# Patient Record
Sex: FEMALE | Race: WHITE | NOT HISPANIC OR LATINO | URBAN - METROPOLITAN AREA
[De-identification: names, ages, dates, MRNs, and addresses within clinical notes are randomized per-mention and may not be internally consistent; named-entity substitution may affect disease eponyms.]

---

## 2017-06-26 ENCOUNTER — HOSPITAL ENCOUNTER (INPATIENT)
Facility: HOSPITAL | Age: 47
LOS: 2 days | Discharge: HOME | DRG: 392 | End: 2017-06-29
Attending: EMERGENCY MEDICINE | Admitting: SURGERY
Payer: COMMERCIAL

## 2017-06-26 DIAGNOSIS — K57.20 DIVERTICULITIS OF LARGE INTESTINE WITH PERFORATION WITHOUT BLEEDING: ICD-10-CM

## 2017-06-26 DIAGNOSIS — K57.80 DIVERTICULITIS OF INTESTINE WITH PERFORATION WITHOUT BLEEDING, UNSPECIFIED PART OF INTESTINAL TRACT: Primary | ICD-10-CM

## 2017-06-26 LAB
ALBUMIN SERPL BCP-MCNC: 3.6 G/DL
ALP SERPL-CCNC: 87 U/L
ALT SERPL W/O P-5'-P-CCNC: 17 U/L
ANION GAP SERPL CALC-SCNC: 12 MMOL/L
AST SERPL-CCNC: 16 U/L
B-HCG UR QL: NEGATIVE
BASOPHILS # BLD AUTO: 0.06 K/UL
BASOPHILS NFR BLD: 0.5 %
BILIRUB SERPL-MCNC: 0.7 MG/DL
BILIRUB UR QL STRIP: NEGATIVE
BUN SERPL-MCNC: 13 MG/DL
CALCIUM SERPL-MCNC: 9.8 MG/DL
CHLORIDE SERPL-SCNC: 102 MMOL/L
CLARITY UR: CLEAR
CO2 SERPL-SCNC: 23 MMOL/L
COLOR UR: ABNORMAL
CREAT SERPL-MCNC: 0.8 MG/DL
CTP QC/QA: YES
DIFFERENTIAL METHOD: ABNORMAL
EOSINOPHIL # BLD AUTO: 0.3 K/UL
EOSINOPHIL NFR BLD: 2 %
ERYTHROCYTE [DISTWIDTH] IN BLOOD BY AUTOMATED COUNT: 13.1 %
EST. GFR  (AFRICAN AMERICAN): >60 ML/MIN/1.73 M^2
EST. GFR  (NON AFRICAN AMERICAN): >60 ML/MIN/1.73 M^2
GLUCOSE SERPL-MCNC: 110 MG/DL
GLUCOSE UR QL STRIP: NEGATIVE
HCT VFR BLD AUTO: 39.5 %
HGB BLD-MCNC: 13.5 G/DL
HGB UR QL STRIP: NEGATIVE
KETONES UR QL STRIP: NEGATIVE
LEUKOCYTE ESTERASE UR QL STRIP: NEGATIVE
LYMPHOCYTES # BLD AUTO: 2.1 K/UL
LYMPHOCYTES NFR BLD: 16.4 %
MCH RBC QN AUTO: 26.9 PG
MCHC RBC AUTO-ENTMCNC: 34.2 %
MCV RBC AUTO: 79 FL
MONOCYTES # BLD AUTO: 1 K/UL
MONOCYTES NFR BLD: 7.7 %
NEUTROPHILS # BLD AUTO: 9.3 K/UL
NEUTROPHILS NFR BLD: 73.2 %
NITRITE UR QL STRIP: NEGATIVE
PH UR STRIP: 6 [PH] (ref 5–8)
PLATELET # BLD AUTO: 361 K/UL
PMV BLD AUTO: 9.5 FL
POTASSIUM SERPL-SCNC: 3.7 MMOL/L
PROT SERPL-MCNC: 8.2 G/DL
PROT UR QL STRIP: NEGATIVE
RBC # BLD AUTO: 5.01 M/UL
SODIUM SERPL-SCNC: 137 MMOL/L
SP GR UR STRIP: 1.02 (ref 1–1.03)
URN SPEC COLLECT METH UR: ABNORMAL
UROBILINOGEN UR STRIP-ACNC: NEGATIVE EU/DL
WBC # BLD AUTO: 12.65 K/UL

## 2017-06-26 PROCEDURE — 81025 URINE PREGNANCY TEST: CPT | Performed by: PHYSICIAN ASSISTANT

## 2017-06-26 PROCEDURE — 80053 COMPREHEN METABOLIC PANEL: CPT

## 2017-06-26 PROCEDURE — 63600175 PHARM REV CODE 636 W HCPCS: Performed by: PHYSICIAN ASSISTANT

## 2017-06-26 PROCEDURE — 96365 THER/PROPH/DIAG IV INF INIT: CPT

## 2017-06-26 PROCEDURE — 25500020 PHARM REV CODE 255: Performed by: EMERGENCY MEDICINE

## 2017-06-26 PROCEDURE — 96376 TX/PRO/DX INJ SAME DRUG ADON: CPT

## 2017-06-26 PROCEDURE — G0378 HOSPITAL OBSERVATION PER HR: HCPCS

## 2017-06-26 PROCEDURE — 25000003 PHARM REV CODE 250: Performed by: PHYSICIAN ASSISTANT

## 2017-06-26 PROCEDURE — 96361 HYDRATE IV INFUSION ADD-ON: CPT

## 2017-06-26 PROCEDURE — 96375 TX/PRO/DX INJ NEW DRUG ADDON: CPT

## 2017-06-26 PROCEDURE — 81003 URINALYSIS AUTO W/O SCOPE: CPT

## 2017-06-26 PROCEDURE — 85025 COMPLETE CBC W/AUTO DIFF WBC: CPT

## 2017-06-26 PROCEDURE — 99285 EMERGENCY DEPT VISIT HI MDM: CPT

## 2017-06-26 RX ORDER — DEXTROSE MONOHYDRATE AND SODIUM CHLORIDE 5; .9 G/100ML; G/100ML
INJECTION, SOLUTION INTRAVENOUS CONTINUOUS
Status: DISCONTINUED | OUTPATIENT
Start: 2017-06-26 | End: 2017-06-29

## 2017-06-26 RX ORDER — SODIUM CHLORIDE 9 MG/ML
1000 INJECTION, SOLUTION INTRAVENOUS
Status: COMPLETED | OUTPATIENT
Start: 2017-06-26 | End: 2017-06-26

## 2017-06-26 RX ORDER — LISINOPRIL AND HYDROCHLOROTHIAZIDE 10; 12.5 MG/1; MG/1
1 TABLET ORAL DAILY
COMMUNITY

## 2017-06-26 RX ORDER — MORPHINE SULFATE 2 MG/ML
4 INJECTION, SOLUTION INTRAMUSCULAR; INTRAVENOUS
Status: COMPLETED | OUTPATIENT
Start: 2017-06-26 | End: 2017-06-26

## 2017-06-26 RX ORDER — ONDANSETRON 2 MG/ML
4 INJECTION INTRAMUSCULAR; INTRAVENOUS
Status: COMPLETED | OUTPATIENT
Start: 2017-06-26 | End: 2017-06-26

## 2017-06-26 RX ORDER — MORPHINE SULFATE 2 MG/ML
2 INJECTION, SOLUTION INTRAMUSCULAR; INTRAVENOUS EVERY 4 HOURS PRN
Status: DISCONTINUED | OUTPATIENT
Start: 2017-06-26 | End: 2017-06-28

## 2017-06-26 RX ORDER — DICYCLOMINE HYDROCHLORIDE 10 MG/1
20 CAPSULE ORAL
Status: COMPLETED | OUTPATIENT
Start: 2017-06-26 | End: 2017-06-26

## 2017-06-26 RX ORDER — ONDANSETRON 2 MG/ML
4 INJECTION INTRAMUSCULAR; INTRAVENOUS EVERY 12 HOURS PRN
Status: DISCONTINUED | OUTPATIENT
Start: 2017-06-26 | End: 2017-06-29 | Stop reason: HOSPADM

## 2017-06-26 RX ADMIN — SODIUM CHLORIDE 1000 ML: 0.9 INJECTION, SOLUTION INTRAVENOUS at 03:06

## 2017-06-26 RX ADMIN — DICYCLOMINE HYDROCHLORIDE 20 MG: 10 CAPSULE ORAL at 03:06

## 2017-06-26 RX ADMIN — SODIUM CHLORIDE 1000 ML: 0.9 INJECTION, SOLUTION INTRAVENOUS at 06:06

## 2017-06-26 RX ADMIN — MORPHINE SULFATE 4 MG: 2 INJECTION, SOLUTION INTRAMUSCULAR; INTRAVENOUS at 04:06

## 2017-06-26 RX ADMIN — MORPHINE SULFATE 4 MG: 2 INJECTION, SOLUTION INTRAMUSCULAR; INTRAVENOUS at 06:06

## 2017-06-26 RX ADMIN — DEXTROSE AND SODIUM CHLORIDE: 5; .9 INJECTION, SOLUTION INTRAVENOUS at 10:06

## 2017-06-26 RX ADMIN — IOHEXOL 75 ML: 350 INJECTION, SOLUTION INTRAVENOUS at 04:06

## 2017-06-26 RX ADMIN — PIPERACILLIN AND TAZOBACTAM 4.5 G: 4; .5 INJECTION, POWDER, LYOPHILIZED, FOR SOLUTION INTRAVENOUS; PARENTERAL at 06:06

## 2017-06-26 RX ADMIN — ONDANSETRON 4 MG: 2 INJECTION INTRAMUSCULAR; INTRAVENOUS at 03:06

## 2017-06-26 RX ADMIN — MORPHINE SULFATE 2 MG: 2 INJECTION, SOLUTION INTRAMUSCULAR; INTRAVENOUS at 10:06

## 2017-06-26 NOTE — LETTER
June 29, 2017    Irais Carr  [address] [place] [address]                [address]  [place] [address]  Phone: [phone]  Fax: [phone] To whom it may concern,    Irais Carr may return to work on 7/12/2017 without restrictions. She has been receiving medical care from 6/26/17 until 6/29/17 and will require this extra time off work prior to returning in order to preserve her health.      Sincerely,          Henri Meléndez MD

## 2017-06-26 NOTE — ED PROVIDER NOTES
Encounter Date: 2017       History     Chief Complaint   Patient presents with    Abdominal Pain     pt c/o abd pain with diarrhea that began on Friday.  Pt states that LLQ pain has worsened.      Irais Carr, a 46 y.o. female that presents to the ED for abdominal pain that is worse on the left side of her abdomen with associated episodes of nausea and diarrhea that started on Friday.  She states that she's had 3 episodes of diarrhea this morning.  She states she has a history of episodes of intense abdominal cramping with diarrhea every couple of months for the last 2 or 3 years.  She usually just self treats and that her symptoms resolved within a day or two, however her symptoms have not improved with this episode.  She is ever seen a primary care doctor or a GI specialist for this issue.  She describes the pain to her abdomen is cramping and spasm-like in nature, worse with certain movement and touch and only slightly improved with rest.  She now denies any blood in her stool or vomiting.  Treatments tried include a bland diet with no improvement of symptoms.      The history is provided by the patient.     Review of patient's allergies indicates:  No Known Allergies  Past Medical History:   Diagnosis Date    Hypertension      Past Surgical History:   Procedure Laterality Date    arm surgery       SECTION       History reviewed. No pertinent family history.  Social History   Substance Use Topics    Smoking status: Former Smoker    Smokeless tobacco: Never Used    Alcohol use No     Review of Systems   Constitutional: Negative for fever.   HENT: Negative.    Eyes: Negative for redness.   Respiratory: Negative for shortness of breath.    Cardiovascular: Negative for chest pain.   Gastrointestinal: Positive for abdominal pain, diarrhea and nausea. Negative for constipation and vomiting.   Endocrine: Negative for polydipsia and polyphagia.   Genitourinary: Negative for dysuria and frequency.    Musculoskeletal: Negative for myalgias.   Skin: Negative for color change and rash.   Allergic/Immunologic: Negative for immunocompromised state.   Neurological: Negative for dizziness, weakness and headaches.   Hematological: Does not bruise/bleed easily.   Psychiatric/Behavioral: Negative for agitation and confusion.   All other systems reviewed and are negative.      Physical Exam     Initial Vitals [06/26/17 1512]   BP Pulse Resp Temp SpO2   (!) 155/87 98 16 98.4 °F (36.9 °C) 100 %      MAP       109.67         Physical Exam    Nursing note and vitals reviewed.  Constitutional: She appears well-developed and well-nourished. No distress.   HENT:   Head: Normocephalic and atraumatic.   Right Ear: External ear normal.   Left Ear: External ear normal.   Nose: Nose normal.   Mouth/Throat: Oropharynx is clear and moist. Mucous membranes are dry.   Eyes: Conjunctivae and EOM are normal.   Neck: Normal range of motion. No tracheal deviation present.   Cardiovascular: Normal rate and regular rhythm.   Pulmonary/Chest: Breath sounds normal. No respiratory distress. She has no wheezes. She has no rhonchi. She has no rales.   Abdominal: Soft. Normal appearance and bowel sounds are normal. She exhibits no distension. There is tenderness in the suprapubic area and left lower quadrant. There is no rigidity, no rebound, no guarding, no CVA tenderness, no tenderness at McBurney's point and negative Milian's sign.   Musculoskeletal: Normal range of motion. She exhibits no tenderness.   Neurological: She is alert and oriented to person, place, and time. She has normal strength.   Skin: Skin is warm and dry. No rash noted. No erythema.   Psychiatric: She has a normal mood and affect. Thought content normal.         ED Course   Procedures  Labs Reviewed   CBC W/ AUTO DIFFERENTIAL - Abnormal; Notable for the following:        Result Value    MCV 79 (*)     MCH 26.9 (*)     Platelets 361 (*)     Gran # 9.3 (*)     Gran% 73.2 (*)      Lymph% 16.4 (*)     All other components within normal limits   URINALYSIS - Abnormal; Notable for the following:     Color, UA Brown (*)     All other components within normal limits   COMPREHENSIVE METABOLIC PANEL   POCT URINE PREGNANCY        Imaging Results          CT Abdomen Pelvis With Contrast (Final result)     Abnormal  Result time 06/26/17 17:59:32    Final result by Wilber Joyner MD (06/26/17 17:59:32)                 Impression:       1.  Diverticulosis coli with inflammatory changes surrounding the sigmoid mesocolon, consistent with acute sigmoid diverticulitis.  Punctate focus of air medial to the regions of inflammation in the sigmoid mesocolon.  This may represent a small area of microperforation.  Clinical correlation and close follow up is suggested. Endoscopic Correlation is suggested after the patient's acute symptoms resolve.     2.  Intrauterine device in place located in the level of the lower uterine segment/cervix.  Suggest clinical correlation and followup with OB/GYN.    3.  Additional findings as above.    Report has been flagged in the EPIC medical record system.        Electronically signed by: WILBER JOYNER MD  Date:     06/26/17  Time:    17:59              Narrative:    Exam: [id number]  06/26/17  16:45:27 [id number] ([facility]) : CT ABDOMEN PELVIS WITH CONTRAST    Technique:    Axial CT Scan of the abdomen and pelvis was performed from the lung base to the public symphysis after the intravenous administration of  75 cc of Omni 350. Coronal and Sagittal reformats were obtained. Delayed images were also obtained    Comparison:     None     Findings:      The lung bases are within normal limits.  The heart is normal in appearance.  The vessels are unremarkable.  There is no evidence of lymphadenopathy in the abdomen or pelvis.    The esophagus, stomach, and duodenum are within normal limits.  The small bowel loops are unremarkable.  The appendix is within normal limits.  There is diverticulosis  coli.  There are inflammatory changes surrounding the sigmoid mesocolon with associated pericolonic stranding.  There is tiny focus of air along the medial aspect of the regions of inflammation.  No drainable collection is identified.    The liver, gallbladder, and biliary system are within normal limits.  The spleen, pancreas, and adrenal glands are within normal limits.    The right kidney is unremarkable.  There are subcentimeter low attenuation lesions in the left kidney, these are too small to completely characterize.  The ureters and urinary bladder are within normal limits.  There is an intrauterine device in place.  There is low position of the intrauterine device in the region of the millimeters in segment/cervix.  The adnexal structures are unremarkable.    There is small amount of fluid in the left posterior cul-de-sac.  There is no evidence of pneumatosis.    There are degenerative changes in the osseous structures.  There is no evidence of fracture.  The abdominal wall is within normal limits.                                 Medical Decision Making:   Initial Assessment:   Abdominal pain, diarrhea, nausea  Differential Diagnosis:   Colitis, diverticulitis, enteritis   Clinical Tests:   Lab Tests: Ordered and Reviewed  The following lab test(s) were unremarkable: CBC, CMP and Urinalysis  Radiological Study: Ordered and Reviewed  ED Management:  Impression presents to ED and moderate distress, afebrile, and nontoxic.  She has tenderness to palpation to the left side of abdomen extending to the suprapubic region without any evidence of peritoneal signs.  Zofran and morphine were given with improvement of nausea and pain.  CBC and CMP showed no acute abnormalities.  CT shows evidence of diverticulosis coli with inflammatory changes surrounding the sigmoid mesocolon, consistent with acute sigmoid diverticulitis.  Punctate focus of air medial to the regions of inflammation in the sigmoid mesocolon.  This may  represent a small area of microperforation.   Dr. Murphy with neurosurgery was consult and for possible admission.  The patient has agreed to admit this patient to have specialty, has instructed to make the patient nothing by mouth and see Rajat of IV Zosyn.  Patient stated that she started to have an increase of her pain therefore more morphine was given.  Patient will be admitted to general surgery service for further evaluation.              Attending Attestation:     Physician Attestation Statement for NP/PA:   I discussed this assessment and plan of this patient with the NP/PA, but I did not personally examine the patient. The face to face encounter was performed by the NP/PA.                  ED Course     Clinical Impression:   The encounter diagnosis was Diverticulitis of intestine with perforation without bleeding, unspecified part of intestinal tract.    Disposition:   Disposition: Discharged  Condition: Stable                        Alice Hernandez PA-C  06/26/17 2128       Kristi Gilmore MD  06/26/17 1020

## 2017-06-26 NOTE — H&P
[facility]  History & Physical    SUBJECTIVE:     Chief Complaint/Reason for Admission: Diverticulitis    History of Present Illness:  Patient is a 46 y.o. female presents with history htn who presents with abdominal pain that she states started last Friday. She states the pain is sharp and achy. The pain is constant but fluctuating and at worst a 10 but improved with pain medication given in the Ed. She states she has had episodes like this previously but has never previously sough medical attention. The denies any n/v but does endorse pain when eating as well as rectal pain. She denies any issues with bowel movement.      (Not in a hospital admission)    Review of patient's allergies indicates:  No Known Allergies    Past Medical History:   Diagnosis Date    Hypertension      Past Surgical History:   Procedure Laterality Date    arm surgery       SECTION       History reviewed. No pertinent family history.  Social History   Substance Use Topics    Smoking status: Former Smoker    Smokeless tobacco: Never Used    Alcohol use No        Review of Systems:  Denies headache, fever, chills, cp, sob, n/v, diarrhea, dysuria, and lower ext swelling      OBJECTIVE:     Vital Signs (Most Recent):  Temp: 96.9 °F (36.1 °C) (17)  Pulse: 77 (17)  Resp: 18 (17)  BP: (!) 100/59 (17)  SpO2: 100 % (17)    Physical Exam:  Gen: NAD  Head: NCAT  Heart: RRR, no m/g/r  Lungs: CTAB  Abd: snt nd  Ext: 2 + peripheral pulses    Laboratory:  CBC:   Recent Labs  Lab 17  1545   WBC 12.65   RBC 5.01   HGB 13.5   HCT 39.5   *   MCV 79*   MCH 26.9*   MCHC 34.2     CMP:   Recent Labs  Lab 17  1545      CALCIUM 9.8   ALBUMIN 3.6   PROT 8.2      K 3.7   CO2 23      BUN 13   CREATININE 0.8   ALKPHOS 87   ALT 17   AST 16   BILITOT 0.7       ASSESSMENT/PLAN:     Diverticulitis  - noted on CT of the abdomen  - will admit for  conservative management with IVF, abx, and pain control  - afebrile and WBC at 12.6  - continue to monitor    Henri Meléndez MD  PGY-2 FM  6:37 PM

## 2017-06-27 PROCEDURE — 63600175 PHARM REV CODE 636 W HCPCS: Performed by: INTERNAL MEDICINE

## 2017-06-27 PROCEDURE — 25000003 PHARM REV CODE 250: Performed by: FAMILY MEDICINE

## 2017-06-27 PROCEDURE — 99221 1ST HOSP IP/OBS SF/LOW 40: CPT | Mod: ,,, | Performed by: INTERNAL MEDICINE

## 2017-06-27 PROCEDURE — 25000003 PHARM REV CODE 250: Performed by: PHYSICIAN ASSISTANT

## 2017-06-27 PROCEDURE — 94761 N-INVAS EAR/PLS OXIMETRY MLT: CPT

## 2017-06-27 PROCEDURE — 63600175 PHARM REV CODE 636 W HCPCS: Performed by: PHYSICIAN ASSISTANT

## 2017-06-27 PROCEDURE — 25000003 PHARM REV CODE 250: Performed by: INTERNAL MEDICINE

## 2017-06-27 PROCEDURE — 11000001 HC ACUTE MED/SURG PRIVATE ROOM

## 2017-06-27 PROCEDURE — 25000003 PHARM REV CODE 250: Performed by: STUDENT IN AN ORGANIZED HEALTH CARE EDUCATION/TRAINING PROGRAM

## 2017-06-27 RX ORDER — MAG HYDROX/ALUMINUM HYD/SIMETH 200-200-20
30 SUSPENSION, ORAL (FINAL DOSE FORM) ORAL
Status: DISCONTINUED | OUTPATIENT
Start: 2017-06-27 | End: 2017-06-29 | Stop reason: HOSPADM

## 2017-06-27 RX ORDER — METRONIDAZOLE 500 MG/100ML
500 INJECTION, SOLUTION INTRAVENOUS
Status: DISCONTINUED | OUTPATIENT
Start: 2017-06-27 | End: 2017-06-29

## 2017-06-27 RX ORDER — ACETAMINOPHEN 325 MG/1
650 TABLET ORAL EVERY 6 HOURS PRN
Status: DISCONTINUED | OUTPATIENT
Start: 2017-06-27 | End: 2017-06-29 | Stop reason: HOSPADM

## 2017-06-27 RX ORDER — CIPROFLOXACIN 2 MG/ML
400 INJECTION, SOLUTION INTRAVENOUS
Status: DISCONTINUED | OUTPATIENT
Start: 2017-06-27 | End: 2017-06-29

## 2017-06-27 RX ADMIN — CIPROFLOXACIN 400 MG: 2 INJECTION, SOLUTION INTRAVENOUS at 02:06

## 2017-06-27 RX ADMIN — MORPHINE SULFATE 2 MG: 2 INJECTION, SOLUTION INTRAMUSCULAR; INTRAVENOUS at 05:06

## 2017-06-27 RX ADMIN — ALUMINUM HYDROXIDE, MAGNESIUM HYDROXIDE, AND SIMETHICONE 30 ML: 200; 200; 20 SUSPENSION ORAL at 10:06

## 2017-06-27 RX ADMIN — ALUMINUM HYDROXIDE, MAGNESIUM HYDROXIDE, AND SIMETHICONE 30 ML: 200; 200; 20 SUSPENSION ORAL at 08:06

## 2017-06-27 RX ADMIN — ACETAMINOPHEN 650 MG: 325 TABLET ORAL at 02:06

## 2017-06-27 RX ADMIN — METRONIDAZOLE 500 MG: 500 INJECTION, SOLUTION INTRAVENOUS at 02:06

## 2017-06-27 RX ADMIN — MORPHINE SULFATE 2 MG: 2 INJECTION, SOLUTION INTRAMUSCULAR; INTRAVENOUS at 03:06

## 2017-06-27 RX ADMIN — DEXTROSE AND SODIUM CHLORIDE: 5; .9 INJECTION, SOLUTION INTRAVENOUS at 09:06

## 2017-06-27 RX ADMIN — DEXTROSE AND SODIUM CHLORIDE: 5; .9 INJECTION, SOLUTION INTRAVENOUS at 05:06

## 2017-06-27 RX ADMIN — MORPHINE SULFATE 2 MG: 2 INJECTION, SOLUTION INTRAMUSCULAR; INTRAVENOUS at 07:06

## 2017-06-27 RX ADMIN — ALUMINUM HYDROXIDE, MAGNESIUM HYDROXIDE, AND SIMETHICONE 30 ML: 200; 200; 20 SUSPENSION ORAL at 05:06

## 2017-06-27 RX ADMIN — MORPHINE SULFATE 2 MG: 2 INJECTION, SOLUTION INTRAMUSCULAR; INTRAVENOUS at 09:06

## 2017-06-27 NOTE — SUBJECTIVE & OBJECTIVE
Past Medical History:   Diagnosis Date    Hypertension        Past Surgical History:   Procedure Laterality Date    arm surgery       SECTION         Review of patient's allergies indicates:  No Known Allergies    Family History  Possible family history of colon cancer in a great grandparent    Social History  No tobacco, alcohol or illicit drug use      Review of Systems   Constitutional: Negative for chills, fever and unexpected weight change.   Respiratory: Negative for cough, choking and chest tightness.    Cardiovascular: Negative for chest pain, palpitations and leg swelling.   Gastrointestinal: Positive for abdominal pain. Negative for anal bleeding, blood in stool, constipation and vomiting.   Neurological: Negative for seizures, light-headedness and headaches.   Psychiatric/Behavioral: Negative for confusion, decreased concentration and hallucinations.     Objective:     Vital Signs (Most Recent):  Temp: 98.6 °F (37 °C) (17 08)  Pulse: 78 (17 08)  Resp: 20 (17 08)  BP: (!) 131/57 (17 08)  SpO2: 99 % (17) Vital Signs (24h Range):  Temp:  [96.9 °F (36.1 °C)-98.6 °F (37 °C)] 98.6 °F (37 °C)  Pulse:  [] 78  Resp:  [16-20] 20  SpO2:  [98 %-100 %] 99 %  BP: ()/(57-87) 131/57     Weight: 108.9 kg (240 lb) (17 1512)  Body mass index is 45.35 kg/m².    No intake or output data in the 24 hours ending 17 1122    Lines/Drains/Airways     Peripheral Intravenous Line                 Peripheral IV - Single Lumen 17 1546 Right Forearm less than 1 day                Physical Exam   Constitutional: She is oriented to person, place, and time. She appears well-developed and well-nourished. No distress.   HENT:   Head: Normocephalic and atraumatic.   Eyes: EOM are normal. Pupils are equal, round, and reactive to light. Right eye exhibits no discharge. Left eye exhibits no discharge. No scleral icterus.   Cardiovascular: Normal rate, regular  rhythm, normal heart sounds and intact distal pulses.  Exam reveals no gallop and no friction rub.    No murmur heard.  Pulmonary/Chest: Effort normal and breath sounds normal. No respiratory distress. She has no wheezes. She has no rales.   Abdominal: Soft. Bowel sounds are normal. She exhibits no distension and no mass. There is tenderness. There is no rebound and no guarding.   Musculoskeletal: Normal range of motion.   Neurological: She is alert and oriented to person, place, and time. No cranial nerve deficit. Coordination normal.   Skin: Skin is warm and dry. She is not diaphoretic.   Psychiatric: Her behavior is normal. Judgment and thought content normal.       Significant Labs:  Reviewed    Significant Imaging:  Reviewed

## 2017-06-27 NOTE — HPI
CC Abdominal pain    HPI The patient is a 47 yo female who presented with acute onset, generalized, cramping lower abdominal pain with several episodes of diarrhea without overt GI bleeding and denies current melena, hematochezia, nausea and vomiting.  She reports previous she has experienced episodes of rectal bleeding but none recently or currently.  She has never had an EGD or colonoscopy.  Possible family history of colon cancer in a great grandparent but the patient is uncertain of the details.    Records reviewed.

## 2017-06-27 NOTE — PROGRESS NOTES
Progress Note    Admit Date: 6/26/2017   LOS: 0 days     SUBJECTIVE:     Pt endorses dark blood per rectum when making BM, otherwise states pain improved with medication    Scheduled Meds:   aluminum-magnesium hydroxide-simethicone  30 mL Oral QID (AC & HS)     Continuous Infusions:   dextrose 5 % and 0.9 % NaCl 125 mL/hr at 06/27/17 0544     PRN Meds:morphine, ondansetron    Review of patient's allergies indicates:  No Known Allergies    Review of Systems  Denies headache, fever, chills, cp, sob, n/v, diarrhea, dysuria, and lower ext swelling      OBJECTIVE:     Vital Signs (Most Recent)  Temp: 98.6 °F (37 °C) (06/27/17 0800)  Pulse: 78 (06/27/17 0800)  Resp: 20 (06/27/17 0800)  BP: (!) 131/57 (06/27/17 0800)  SpO2: 98 % (06/27/17 0454)    Vital Signs Range (Last 24H):  Temp:  [96.9 °F (36.1 °C)-98.6 °F (37 °C)]   Pulse:  []   Resp:  [16-20]   BP: ()/(57-87)   SpO2:  [98 %-100 %]     I & O (Last 24H):No intake or output data in the 24 hours ending 06/27/17 1004  Physical Exam:  Gen: NAD  Head: NCAT  Heart: RRR, no m/g/r  Lungs: CTAB  Abd: snt nd  Ext: 2 + peripheral pulses      Laboratory:  CBC:   Recent Labs  Lab 06/26/17  1545   WBC 12.65   RBC 5.01   HGB 13.5   HCT 39.5   *   MCV 79*   MCH 26.9*   MCHC 34.2     CMP:   Recent Labs  Lab 06/26/17  1545      CALCIUM 9.8   ALBUMIN 3.6   PROT 8.2      K 3.7   CO2 23      BUN 13   CREATININE 0.8   ALKPHOS 87   ALT 17   AST 16   BILITOT 0.7           ASSESSMENT/PLAN:     Diverticulitis  - pt with abd and evidence diverticulitis on CT  - the patient states she gets abdominal pain monthly but has never sought medical attention  - will consult GI due suspicion for recurrent episodes diverticulitis as well as melena  - continue IV abx for 24 hours, conservative management at the moment unless clinical picture changes    Henri Meléndez MD  PGY-2   10:07 AM

## 2017-06-27 NOTE — PLAN OF CARE
Progress notes reviewed:  Diverticulitis:  - noted on CT of the abdomen  - will admit for conservative management with IVF, abx, and pain control  - afebrile and WBC at 12.6  - continue to monitor    Pt does not have PCP; TN to assist with establishing PCP.  Pt voices no other discharge needs.     06/27/17 0929   Discharge Assessment   Assessment Type Discharge Planning Assessment   Confirmed/corrected address and phone number on facesheet? Yes   Assessment information obtained from? Patient   Expected Length of Stay (days) 1   Communicated expected length of stay with patient/caregiver yes   Prior to hospitilization cognitive status: Alert/Oriented   Prior to hospitalization functional status: Independent   Current cognitive status: Alert/Oriented   Current Functional Status: Independent   Arrived From home or self-care   Lives With significant other   Able to Return to Prior Arrangements yes   Is patient able to care for self after discharge? Yes   How many people do you have in your home that can help with your care after discharge? 1   Who are your caregiver(s) and their phone number(s)? Grisel Crane (S.O.) [phone]   Patient's perception of discharge disposition home or selfcare   Readmission Within The Last 30 Days no previous admission in last 30 days   Patient currently being followed by outpatient case management? No   Patient currently receives home health services? No   Does the patient currently use HME? No   Patient currently receives private duty nursing? No   Patient currently receives any other outside agency services? No   Equipment Currently Used at Home none   Do you have any problems affording any of your prescribed medications? No   Is the patient taking medications as prescribed? yes   Do you have any financial concerns preventing you from receiving the healthcare you need? No   Does the patient have transportation to healthcare appointments? Yes   Transportation Available car;family or  friend will provide   On Dialysis? No   Does the patient receive services at the Coumadin Clinic? No   Are there any open cases? No   Discharge Plan A Home   Discharge Plan B Home with family   Patient/Family In Agreement With Plan yes

## 2017-06-27 NOTE — CONSULTS
[facility]  Gastroenterology  Consult Note    Patient Name: Irais Carr  MRN: [id number]  Admission Date: 2017  Hospital Length of Stay: 0 days  Code Status: Full Code   Attending Provider: VERONIKA Hodges MD   Consulting Provider: Cordelia Quiroga MD  Primary Care Physician: Primary Doctor No  Principal Problem:<principal problem not specified>    Inpatient consult to [facility]  Consult performed by: CORDELIA QUIROGA  Consult ordered by: GIOVANNI TERRY  Reason for consult: Diverticulitis  Assessment/Recommendations: See Plan        Subjective:     HPI:  CC Abdominal pain    HPI The patient is a 45 yo female who presented with acute onset, generalized, cramping lower abdominal pain with several episodes of diarrhea without overt GI bleeding and denies current melena, hematochezia, nausea and vomiting.  She reports previous she has experienced episodes of rectal bleeding but none recently or currently.  She has never had an EGD or colonoscopy.  Possible family history of colon cancer in a great grandparent but the patient is uncertain of the details.    Records reviewed.    Past Medical History:   Diagnosis Date    Hypertension        Past Surgical History:   Procedure Laterality Date    arm surgery       SECTION         Review of patient's allergies indicates:  No Known Allergies    Family History  Possible family history of colon cancer in a great grandparent    Social History  No tobacco, alcohol or illicit drug use      Review of Systems   Constitutional: Negative for chills, fever and unexpected weight change.   Respiratory: Negative for cough, choking and chest tightness.    Cardiovascular: Negative for chest pain, palpitations and leg swelling.   Gastrointestinal: Positive for abdominal pain. Negative for anal bleeding, blood in stool, constipation and vomiting.   Neurological: Negative for seizures, light-headedness and headaches.    Psychiatric/Behavioral: Negative for confusion, decreased concentration and hallucinations.     Objective:     Vital Signs (Most Recent):  Temp: 98.6 °F (37 °C) (06/27/17 0800)  Pulse: 78 (06/27/17 0800)  Resp: 20 (06/27/17 0800)  BP: (!) 131/57 (06/27/17 0800)  SpO2: 99 % (06/27/17 0800) Vital Signs (24h Range):  Temp:  [96.9 °F (36.1 °C)-98.6 °F (37 °C)] 98.6 °F (37 °C)  Pulse:  [] 78  Resp:  [16-20] 20  SpO2:  [98 %-100 %] 99 %  BP: ()/(57-87) 131/57     Weight: 108.9 kg (240 lb) (06/26/17 1512)  Body mass index is 45.35 kg/m².    No intake or output data in the 24 hours ending 06/27/17 1122    Lines/Drains/Airways     Peripheral Intravenous Line                 Peripheral IV - Single Lumen 06/26/17 1546 Right Forearm less than 1 day                Physical Exam   Constitutional: She is oriented to person, place, and time. She appears well-developed and well-nourished. No distress.   HENT:   Head: Normocephalic and atraumatic.   Eyes: EOM are normal. Pupils are equal, round, and reactive to light. Right eye exhibits no discharge. Left eye exhibits no discharge. No scleral icterus.   Cardiovascular: Normal rate, regular rhythm, normal heart sounds and intact distal pulses.  Exam reveals no gallop and no friction rub.    No murmur heard.  Pulmonary/Chest: Effort normal and breath sounds normal. No respiratory distress. She has no wheezes. She has no rales.   Abdominal: Soft. Bowel sounds are normal. She exhibits no distension and no mass. There is tenderness. There is no rebound and no guarding.   Musculoskeletal: Normal range of motion.   Neurological: She is alert and oriented to person, place, and time. No cranial nerve deficit. Coordination normal.   Skin: Skin is warm and dry. She is not diaphoretic.   Psychiatric: Her behavior is normal. Judgment and thought content normal.       Significant Labs:  Reviewed    Significant Imaging:  Reviewed    Assessment/Plan:     Diverticulitis of intestine  with perforation without bleeding    -Diverticulitis with possible small microperforation-currently the patient is afebrile, without leukocytosis and vitals or stable  -No diarrhea or GI bleeding currently.  -Continue cipro and flagyl for 14 days total to treat for diverticulitis  -Continue supportive care  -Advance diet as tolerated per surgery primary team  -Plan for follow up in GI clinic following discharge once her acute episodes of diverticulitis has resolved to schedule a colonoscopy to evaluate for other causes for her presentation and to further evaluate her previous rectal bleeding.    Please call with questions.                Thank you for your consult. I will follow-up with patient. Please contact us if you have any additional questions.    Cordelia Chandra MD  Gastroenterology  [facility]

## 2017-06-27 NOTE — ASSESSMENT & PLAN NOTE
-Diverticulitis with possible small microperforation-currently the patient is afebrile, without leukocytosis and vitals or stable  -No diarrhea or GI bleeding currently.  -Continue cipro and flagyl for 14 days total to treat for diverticulitis  -Continue supportive care  -Advance diet as tolerated per surgery primary team  -Plan for follow up in GI clinic following discharge once her acute episodes of diverticulitis has resolved to schedule a colonoscopy to evaluate for other causes for her presentation and to further evaluate her previous rectal bleeding.    Please call with questions.

## 2017-06-28 PROBLEM — K57.20 DIVERTICULITIS OF LARGE INTESTINE WITH PERFORATION WITHOUT BLEEDING: Status: ACTIVE | Noted: 2017-06-26

## 2017-06-28 LAB
ALBUMIN SERPL BCP-MCNC: 3.2 G/DL
ALP SERPL-CCNC: 78 U/L
ALT SERPL W/O P-5'-P-CCNC: 12 U/L
ANION GAP SERPL CALC-SCNC: 9 MMOL/L
AST SERPL-CCNC: 12 U/L
BASOPHILS # BLD AUTO: 0.04 K/UL
BASOPHILS NFR BLD: 0.4 %
BILIRUB SERPL-MCNC: 0.7 MG/DL
BUN SERPL-MCNC: 6 MG/DL
CALCIUM SERPL-MCNC: 9 MG/DL
CHLORIDE SERPL-SCNC: 105 MMOL/L
CO2 SERPL-SCNC: 25 MMOL/L
CREAT SERPL-MCNC: 0.8 MG/DL
DIFFERENTIAL METHOD: ABNORMAL
EOSINOPHIL # BLD AUTO: 0.3 K/UL
EOSINOPHIL NFR BLD: 2.9 %
ERYTHROCYTE [DISTWIDTH] IN BLOOD BY AUTOMATED COUNT: 13 %
EST. GFR  (AFRICAN AMERICAN): >60 ML/MIN/1.73 M^2
EST. GFR  (NON AFRICAN AMERICAN): >60 ML/MIN/1.73 M^2
GLUCOSE SERPL-MCNC: 96 MG/DL
HCT VFR BLD AUTO: 38 %
HGB BLD-MCNC: 12.7 G/DL
LYMPHOCYTES # BLD AUTO: 1.9 K/UL
LYMPHOCYTES NFR BLD: 19.3 %
MAGNESIUM SERPL-MCNC: 2.2 MG/DL
MCH RBC QN AUTO: 26.8 PG
MCHC RBC AUTO-ENTMCNC: 33.4 %
MCV RBC AUTO: 80 FL
MONOCYTES # BLD AUTO: 0.8 K/UL
MONOCYTES NFR BLD: 8 %
NEUTROPHILS # BLD AUTO: 6.8 K/UL
NEUTROPHILS NFR BLD: 69.3 %
PHOSPHATE SERPL-MCNC: 2.4 MG/DL
PLATELET # BLD AUTO: 325 K/UL
PMV BLD AUTO: 9.6 FL
POTASSIUM SERPL-SCNC: 3.6 MMOL/L
PROT SERPL-MCNC: 7.2 G/DL
RBC # BLD AUTO: 4.74 M/UL
SODIUM SERPL-SCNC: 139 MMOL/L
WBC # BLD AUTO: 9.87 K/UL

## 2017-06-28 PROCEDURE — 25000003 PHARM REV CODE 250: Performed by: SURGERY

## 2017-06-28 PROCEDURE — 25000003 PHARM REV CODE 250: Performed by: FAMILY MEDICINE

## 2017-06-28 PROCEDURE — 25000003 PHARM REV CODE 250: Performed by: PHYSICIAN ASSISTANT

## 2017-06-28 PROCEDURE — 11000001 HC ACUTE MED/SURG PRIVATE ROOM

## 2017-06-28 PROCEDURE — 25000003 PHARM REV CODE 250: Performed by: STUDENT IN AN ORGANIZED HEALTH CARE EDUCATION/TRAINING PROGRAM

## 2017-06-28 PROCEDURE — 25000003 PHARM REV CODE 250: Performed by: INTERNAL MEDICINE

## 2017-06-28 PROCEDURE — 36415 COLL VENOUS BLD VENIPUNCTURE: CPT

## 2017-06-28 PROCEDURE — 63600175 PHARM REV CODE 636 W HCPCS: Performed by: SURGERY

## 2017-06-28 PROCEDURE — 63600175 PHARM REV CODE 636 W HCPCS: Performed by: INTERNAL MEDICINE

## 2017-06-28 PROCEDURE — 85025 COMPLETE CBC W/AUTO DIFF WBC: CPT

## 2017-06-28 PROCEDURE — 94761 N-INVAS EAR/PLS OXIMETRY MLT: CPT

## 2017-06-28 PROCEDURE — 99232 SBSQ HOSP IP/OBS MODERATE 35: CPT | Mod: ,,, | Performed by: INTERNAL MEDICINE

## 2017-06-28 PROCEDURE — 84100 ASSAY OF PHOSPHORUS: CPT

## 2017-06-28 PROCEDURE — 83735 ASSAY OF MAGNESIUM: CPT

## 2017-06-28 PROCEDURE — 80053 COMPREHEN METABOLIC PANEL: CPT

## 2017-06-28 PROCEDURE — 63600175 PHARM REV CODE 636 W HCPCS: Performed by: PHYSICIAN ASSISTANT

## 2017-06-28 RX ORDER — TRAMADOL HYDROCHLORIDE 50 MG/1
50 TABLET ORAL EVERY 6 HOURS
Status: DISCONTINUED | OUTPATIENT
Start: 2017-06-28 | End: 2017-06-28

## 2017-06-28 RX ORDER — TRAMADOL HYDROCHLORIDE 50 MG/1
50 TABLET ORAL EVERY 6 HOURS
Status: DISCONTINUED | OUTPATIENT
Start: 2017-06-28 | End: 2017-06-29 | Stop reason: HOSPADM

## 2017-06-28 RX ORDER — KETOROLAC TROMETHAMINE 30 MG/ML
15 INJECTION, SOLUTION INTRAMUSCULAR; INTRAVENOUS EVERY 6 HOURS
Status: COMPLETED | OUTPATIENT
Start: 2017-06-28 | End: 2017-06-29

## 2017-06-28 RX ORDER — KETOROLAC TROMETHAMINE 30 MG/ML
15 INJECTION, SOLUTION INTRAMUSCULAR; INTRAVENOUS EVERY 6 HOURS
Status: DISCONTINUED | OUTPATIENT
Start: 2017-06-28 | End: 2017-06-28

## 2017-06-28 RX ADMIN — KETOROLAC TROMETHAMINE 15 MG: 30 INJECTION, SOLUTION INTRAMUSCULAR at 11:06

## 2017-06-28 RX ADMIN — KETOROLAC TROMETHAMINE 15 MG: 30 INJECTION, SOLUTION INTRAMUSCULAR at 05:06

## 2017-06-28 RX ADMIN — ACETAMINOPHEN 650 MG: 325 TABLET ORAL at 04:06

## 2017-06-28 RX ADMIN — TRAMADOL HYDROCHLORIDE 50 MG: 50 TABLET, COATED ORAL at 02:06

## 2017-06-28 RX ADMIN — METRONIDAZOLE 500 MG: 500 INJECTION, SOLUTION INTRAVENOUS at 09:06

## 2017-06-28 RX ADMIN — METRONIDAZOLE 500 MG: 500 INJECTION, SOLUTION INTRAVENOUS at 01:06

## 2017-06-28 RX ADMIN — MORPHINE SULFATE 2 MG: 2 INJECTION, SOLUTION INTRAMUSCULAR; INTRAVENOUS at 05:06

## 2017-06-28 RX ADMIN — ALUMINUM HYDROXIDE, MAGNESIUM HYDROXIDE, AND SIMETHICONE 30 ML: 200; 200; 20 SUSPENSION ORAL at 05:06

## 2017-06-28 RX ADMIN — CIPROFLOXACIN 400 MG: 2 INJECTION, SOLUTION INTRAVENOUS at 03:06

## 2017-06-28 RX ADMIN — ALUMINUM HYDROXIDE, MAGNESIUM HYDROXIDE, AND SIMETHICONE 30 ML: 200; 200; 20 SUSPENSION ORAL at 11:06

## 2017-06-28 RX ADMIN — TRAMADOL HYDROCHLORIDE 50 MG: 50 TABLET, COATED ORAL at 09:06

## 2017-06-28 RX ADMIN — ACETAMINOPHEN 650 MG: 325 TABLET ORAL at 08:06

## 2017-06-28 RX ADMIN — MORPHINE SULFATE 2 MG: 2 INJECTION, SOLUTION INTRAMUSCULAR; INTRAVENOUS at 01:06

## 2017-06-28 RX ADMIN — CIPROFLOXACIN 400 MG: 2 INJECTION, SOLUTION INTRAVENOUS at 02:06

## 2017-06-28 RX ADMIN — METRONIDAZOLE 500 MG: 500 INJECTION, SOLUTION INTRAVENOUS at 05:06

## 2017-06-28 RX ADMIN — ALUMINUM HYDROXIDE, MAGNESIUM HYDROXIDE, AND SIMETHICONE 30 ML: 200; 200; 20 SUSPENSION ORAL at 09:06

## 2017-06-28 RX ADMIN — DEXTROSE AND SODIUM CHLORIDE: 5; .9 INJECTION, SOLUTION INTRAVENOUS at 04:06

## 2017-06-28 NOTE — PLAN OF CARE
Problem: Patient Care Overview  Goal: Plan of Care Review  Outcome: Ongoing (interventions implemented as appropriate)  Pt is AAOx3. No complaints of nausea, vomiting, or diarrhea. Pt complains of abdominal pain and morphine given. NPO. Up ad shorty. Safety precautions maintained. Tolerated all medications well.

## 2017-06-28 NOTE — PLAN OF CARE
Problem: Patient Care Overview  Goal: Plan of Care Review  Outcome: Ongoing (interventions implemented as appropriate)  Pt on RA with sats of 99%. Will continue to monitor.

## 2017-06-28 NOTE — ASSESSMENT & PLAN NOTE
-Diverticulitis with possible small microperforation  -Continue cipro and flagyl for 14 days total to treat for diverticulitis  -Continue supportive care and pain control  -Advance diet as tolerated per surgery primary team.  Diet changed to clear liquids this morning  -Follow up in GI clinic once her acute episode of diverticulitis has resolved to schedule outpatient colonoscopy for further evaluation.    Please call with questions.    Cordelia Chandra  [employer] GI Fellow PGY V  Pager [phone]

## 2017-06-28 NOTE — PLAN OF CARE
Problem: Patient Care Overview  Goal: Plan of Care Review  Outcome: Revised  Patient is awake, alert, and oriented.  Patient ambulates to bathroom per self.  Patient complains about intermittent abdominal discomfort and relieved by Toradol and Tylenol.  Patient continues to receive IVF and antibiotics.  Safety maintained.  Will continue to monitor.

## 2017-06-28 NOTE — SUBJECTIVE & OBJECTIVE
Subjective:     Interval History: Overnight the patient reports her abdominal pain is much improved and controlled with pain medications.  She is very hungry.  She denies nausea, vomiting, melena or hematochezia.  No fevers or chills.    Review of Systems   Constitutional: Negative for activity change, chills and fever.   Respiratory: Negative for chest tightness, shortness of breath and wheezing.    Cardiovascular: Negative for chest pain, palpitations and leg swelling.   Gastrointestinal: Positive for abdominal pain. Negative for abdominal distention, blood in stool and diarrhea.   Skin: Negative for pallor, rash and wound.   Neurological: Positive for headaches. Negative for seizures and light-headedness.   Psychiatric/Behavioral: Negative for confusion, decreased concentration and dysphoric mood.     Objective:     Vital Signs (Most Recent):  Temp: 99 °F (37.2 °C) (06/28/17 0740)  Pulse: 96 (06/28/17 0906)  Resp: 18 (06/28/17 0740)  BP: 136/73 (06/28/17 0740)  SpO2: 99 % (06/28/17 0906) Vital Signs (24h Range):  Temp:  [97.4 °F (36.3 °C)-99 °F (37.2 °C)] 99 °F (37.2 °C)  Pulse:  [76-96] 96  Resp:  [18] 18  SpO2:  [96 %-99 %] 99 %  BP: (101-136)/(55-73) 136/73     Weight: 108.9 kg (240 lb) (06/26/17 1512)  Body mass index is 45.35 kg/m².      Intake/Output Summary (Last 24 hours) at 06/28/17 1029  Last data filed at 06/28/17 0559   Gross per 24 hour   Intake             1800 ml   Output             2300 ml   Net             -500 ml       Lines/Drains/Airways     Peripheral Intravenous Line                 Peripheral IV - Single Lumen 06/28/17 0239 Left Hand less than 1 day                Physical Exam   Constitutional: She is oriented to person, place, and time. She appears well-developed and well-nourished. No distress.   HENT:   Head: Normocephalic and atraumatic.   Eyes: EOM are normal. Pupils are equal, round, and reactive to light.   Cardiovascular: Normal rate, regular rhythm, normal heart sounds and  intact distal pulses.  Exam reveals no friction rub.    No murmur heard.  Pulmonary/Chest: Effort normal and breath sounds normal. No respiratory distress. She has no wheezes. She has no rales.   Abdominal: Soft. Bowel sounds are normal. She exhibits no distension and no mass. There is no tenderness. There is no guarding.   Musculoskeletal: Normal range of motion.   Neurological: She is alert and oriented to person, place, and time.   Skin: Skin is warm and dry. She is not diaphoretic. No erythema. No pallor.   Psychiatric: She has a normal mood and affect. Her behavior is normal. Thought content normal.       Significant Labs:  Reviewed    Significant Imaging:  Reviewed

## 2017-06-28 NOTE — PROGRESS NOTES
[facility]  Gastroenterology  Progress Note    Patient Name: Irais Carr  MRN: [id number]  Admission Date: 6/26/2017  Hospital Length of Stay: 1 days  Code Status: Full Code   Attending Provider: VERONIKA Hodges MD  Consulting Provider: Cordelia Chandra MD  Primary Care Physician: Primary Doctor No  Principal Problem: <principal problem not specified>      Subjective:     Interval History: Overnight the patient reports her abdominal pain is much improved and controlled with pain medications.  She is very hungry.  She denies nausea, vomiting, melena or hematochezia.  No fevers or chills.    Review of Systems   Constitutional: Negative for activity change, chills and fever.   Respiratory: Negative for chest tightness, shortness of breath and wheezing.    Cardiovascular: Negative for chest pain, palpitations and leg swelling.   Gastrointestinal: Positive for abdominal pain. Negative for abdominal distention, blood in stool and diarrhea.   Skin: Negative for pallor, rash and wound.   Neurological: Positive for headaches. Negative for seizures and light-headedness.   Psychiatric/Behavioral: Negative for confusion, decreased concentration and dysphoric mood.     Objective:     Vital Signs (Most Recent):  Temp: 99 °F (37.2 °C) (06/28/17 0740)  Pulse: 96 (06/28/17 0906)  Resp: 18 (06/28/17 0740)  BP: 136/73 (06/28/17 0740)  SpO2: 99 % (06/28/17 0906) Vital Signs (24h Range):  Temp:  [97.4 °F (36.3 °C)-99 °F (37.2 °C)] 99 °F (37.2 °C)  Pulse:  [76-96] 96  Resp:  [18] 18  SpO2:  [96 %-99 %] 99 %  BP: (101-136)/(55-73) 136/73     Weight: 108.9 kg (240 lb) (06/26/17 1512)  Body mass index is 45.35 kg/m².      Intake/Output Summary (Last 24 hours) at 06/28/17 1029  Last data filed at 06/28/17 0559   Gross per 24 hour   Intake             1800 ml   Output             2300 ml   Net             -500 ml       Lines/Drains/Airways     Peripheral Intravenous Line                 Peripheral IV - Single Lumen  06/28/17 0239 Left Hand less than 1 day                Physical Exam   Constitutional: She is oriented to person, place, and time. She appears well-developed and well-nourished. No distress.   HENT:   Head: Normocephalic and atraumatic.   Eyes: EOM are normal. Pupils are equal, round, and reactive to light.   Cardiovascular: Normal rate, regular rhythm, normal heart sounds and intact distal pulses.  Exam reveals no friction rub.    No murmur heard.  Pulmonary/Chest: Effort normal and breath sounds normal. No respiratory distress. She has no wheezes. She has no rales.   Abdominal: Soft. Bowel sounds are normal. She exhibits no distension and no mass. There is no tenderness. There is no guarding.   Musculoskeletal: Normal range of motion.   Neurological: She is alert and oriented to person, place, and time.   Skin: Skin is warm and dry. She is not diaphoretic. No erythema. No pallor.   Psychiatric: She has a normal mood and affect. Her behavior is normal. Thought content normal.       Significant Labs:  Reviewed    Significant Imaging:  Reviewed    Assessment/Plan:     Diverticulitis of intestine with perforation without bleeding    -Diverticulitis with possible small microperforation  -Continue cipro and flagyl for 14 days total to treat for diverticulitis  -Continue supportive care and pain control  -Advance diet as tolerated per surgery primary team.  Diet changed to clear liquids this morning  -Follow up in GI clinic once her acute episode of diverticulitis has resolved to schedule outpatient colonoscopy for further evaluation.    Please call with questions.    Cordelia Chandra  [employer] GI Fellow PGY V  Pager [phone]            Thank you for your consult. I will sign off. Please contact us if you have any additional questions.    Cordelia Chandra MD  Gastroenterology  [facility]

## 2017-06-28 NOTE — PROGRESS NOTES
Progress Note    Admit Date: 6/26/2017   LOS: 1 day     SUBJECTIVE:     Pt endorses dark blood per rectum when making BM, otherwise states pain improved with medication    Scheduled Meds:   aluminum-magnesium hydroxide-simethicone  30 mL Oral QID (AC & HS)    ciprofloxacin  400 mg Intravenous Q12H    metronidazole  500 mg Intravenous Q8H     Continuous Infusions:   dextrose 5 % and 0.9 % NaCl 125 mL/hr at 06/27/17 2110     PRN Meds:acetaminophen, morphine, ondansetron    Review of patient's allergies indicates:  No Known Allergies    Review of Systems  Denies headache, fever, chills, cp, sob, n/v, diarrhea, dysuria, and lower ext swelling      OBJECTIVE:     Vital Signs (Most Recent)  Temp: 99 °F (37.2 °C) (06/28/17 0740)  Pulse: 82 (06/28/17 0740)  Resp: 18 (06/28/17 0740)  BP: 136/73 (06/28/17 0740)  SpO2: 99 % (06/28/17 0536)    Vital Signs Range (Last 24H):  Temp:  [97.4 °F (36.3 °C)-99 °F (37.2 °C)]   Pulse:  [76-82]   Resp:  [18]   BP: (101-136)/(55-73)   SpO2:  [96 %-99 %]     I & O (Last 24H):    Intake/Output Summary (Last 24 hours) at 06/28/17 0854  Last data filed at 06/28/17 0559   Gross per 24 hour   Intake             1800 ml   Output             2300 ml   Net             -500 ml     Physical Exam:  Gen: NAD  Head: NCAT  Heart: RRR, no m/g/r  Lungs: CTAB  Abd: snt nd  Ext: 2 + peripheral pulses      Laboratory:  CBC:     Recent Labs  Lab 06/28/17  0813   WBC 9.87   RBC 4.74   HGB 12.7   HCT 38.0      MCV 80*   MCH 26.8*   MCHC 33.4     CMP:     Recent Labs  Lab 06/26/17  1545      CALCIUM 9.8   ALBUMIN 3.6   PROT 8.2      K 3.7   CO2 23      BUN 13   CREATININE 0.8   ALKPHOS 87   ALT 17   AST 16   BILITOT 0.7           ASSESSMENT/PLAN:     Diverticulitis  - pt with abd and evidence diverticulitis on CT  - the patient states she gets abdominal pain monthly but has never sought medical attention  - appreciate GI  - continue IV abx     Henri Meléndez MD  PGY-2   10:07  AM

## 2017-06-29 VITALS
WEIGHT: 240 LBS | RESPIRATION RATE: 18 BRPM | HEIGHT: 61 IN | OXYGEN SATURATION: 96 % | SYSTOLIC BLOOD PRESSURE: 136 MMHG | TEMPERATURE: 98 F | DIASTOLIC BLOOD PRESSURE: 79 MMHG | HEART RATE: 89 BPM | BODY MASS INDEX: 45.31 KG/M2

## 2017-06-29 LAB
ALBUMIN SERPL BCP-MCNC: 3 G/DL
ALP SERPL-CCNC: 70 U/L
ALT SERPL W/O P-5'-P-CCNC: 9 U/L
ANION GAP SERPL CALC-SCNC: 7 MMOL/L
AST SERPL-CCNC: 12 U/L
BILIRUB SERPL-MCNC: 0.6 MG/DL
BUN SERPL-MCNC: 5 MG/DL
CALCIUM SERPL-MCNC: 8.7 MG/DL
CHLORIDE SERPL-SCNC: 106 MMOL/L
CO2 SERPL-SCNC: 25 MMOL/L
CREAT SERPL-MCNC: 0.7 MG/DL
EST. GFR  (AFRICAN AMERICAN): >60 ML/MIN/1.73 M^2
EST. GFR  (NON AFRICAN AMERICAN): >60 ML/MIN/1.73 M^2
GLUCOSE SERPL-MCNC: 96 MG/DL
MAGNESIUM SERPL-MCNC: 2.1 MG/DL
PHOSPHATE SERPL-MCNC: 1.9 MG/DL
POTASSIUM SERPL-SCNC: 3.5 MMOL/L
PROT SERPL-MCNC: 6.7 G/DL
SODIUM SERPL-SCNC: 138 MMOL/L

## 2017-06-29 PROCEDURE — 63600175 PHARM REV CODE 636 W HCPCS: Performed by: INTERNAL MEDICINE

## 2017-06-29 PROCEDURE — 63600175 PHARM REV CODE 636 W HCPCS: Performed by: SURGERY

## 2017-06-29 PROCEDURE — 94761 N-INVAS EAR/PLS OXIMETRY MLT: CPT

## 2017-06-29 PROCEDURE — 25000003 PHARM REV CODE 250: Performed by: SURGERY

## 2017-06-29 PROCEDURE — 83735 ASSAY OF MAGNESIUM: CPT

## 2017-06-29 PROCEDURE — 25000003 PHARM REV CODE 250: Performed by: INTERNAL MEDICINE

## 2017-06-29 PROCEDURE — 84100 ASSAY OF PHOSPHORUS: CPT

## 2017-06-29 PROCEDURE — 80053 COMPREHEN METABOLIC PANEL: CPT

## 2017-06-29 PROCEDURE — 36415 COLL VENOUS BLD VENIPUNCTURE: CPT

## 2017-06-29 PROCEDURE — 25000003 PHARM REV CODE 250: Performed by: FAMILY MEDICINE

## 2017-06-29 PROCEDURE — 25000003 PHARM REV CODE 250: Performed by: PHYSICIAN ASSISTANT

## 2017-06-29 PROCEDURE — 25000003 PHARM REV CODE 250: Performed by: STUDENT IN AN ORGANIZED HEALTH CARE EDUCATION/TRAINING PROGRAM

## 2017-06-29 RX ORDER — POTASSIUM CHLORIDE 20 MEQ/1
40 TABLET, EXTENDED RELEASE ORAL ONCE
Status: COMPLETED | OUTPATIENT
Start: 2017-06-29 | End: 2017-06-29

## 2017-06-29 RX ORDER — METRONIDAZOLE 500 MG/1
500 TABLET ORAL EVERY 8 HOURS
Status: DISCONTINUED | OUTPATIENT
Start: 2017-06-29 | End: 2017-06-29 | Stop reason: HOSPADM

## 2017-06-29 RX ORDER — CIPROFLOXACIN 500 MG/1
500 TABLET ORAL EVERY 12 HOURS
Status: DISCONTINUED | OUTPATIENT
Start: 2017-06-29 | End: 2017-06-29 | Stop reason: HOSPADM

## 2017-06-29 RX ORDER — METRONIDAZOLE 500 MG/1
500 TABLET ORAL EVERY 12 HOURS
Qty: 16 TABLET | Refills: 0 | Status: SHIPPED | OUTPATIENT
Start: 2017-06-29 | End: 2017-07-07

## 2017-06-29 RX ORDER — CIPROFLOXACIN 500 MG/1
500 TABLET ORAL EVERY 12 HOURS
Qty: 16 TABLET | Refills: 0 | Status: SHIPPED | OUTPATIENT
Start: 2017-06-29 | End: 2017-07-07

## 2017-06-29 RX ORDER — TRAMADOL HYDROCHLORIDE 50 MG/1
50 TABLET ORAL EVERY 6 HOURS PRN
Qty: 20 TABLET | Refills: 0 | Status: SHIPPED | OUTPATIENT
Start: 2017-06-29 | End: 2017-07-04

## 2017-06-29 RX ADMIN — POTASSIUM PHOSPHATE, MONOBASIC AND POTASSIUM PHOSPHATE, DIBASIC 20 MMOL: 224; 236 INJECTION, SOLUTION, CONCENTRATE INTRAVENOUS at 10:06

## 2017-06-29 RX ADMIN — CIPROFLOXACIN 400 MG: 2 INJECTION, SOLUTION INTRAVENOUS at 02:06

## 2017-06-29 RX ADMIN — TRAMADOL HYDROCHLORIDE 50 MG: 50 TABLET, COATED ORAL at 08:06

## 2017-06-29 RX ADMIN — DEXTROSE AND SODIUM CHLORIDE: 5; .9 INJECTION, SOLUTION INTRAVENOUS at 05:06

## 2017-06-29 RX ADMIN — ACETAMINOPHEN 650 MG: 325 TABLET ORAL at 01:06

## 2017-06-29 RX ADMIN — POTASSIUM CHLORIDE 40 MEQ: 20 TABLET, EXTENDED RELEASE ORAL at 11:06

## 2017-06-29 RX ADMIN — ALUMINUM HYDROXIDE, MAGNESIUM HYDROXIDE, AND SIMETHICONE 30 ML: 200; 200; 20 SUSPENSION ORAL at 10:06

## 2017-06-29 RX ADMIN — METRONIDAZOLE 500 MG: 500 INJECTION, SOLUTION INTRAVENOUS at 01:06

## 2017-06-29 RX ADMIN — TRAMADOL HYDROCHLORIDE 50 MG: 50 TABLET, COATED ORAL at 02:06

## 2017-06-29 RX ADMIN — TRAMADOL HYDROCHLORIDE 50 MG: 50 TABLET, COATED ORAL at 03:06

## 2017-06-29 RX ADMIN — METRONIDAZOLE 500 MG: 500 TABLET ORAL at 05:06

## 2017-06-29 RX ADMIN — ALUMINUM HYDROXIDE, MAGNESIUM HYDROXIDE, AND SIMETHICONE 30 ML: 200; 200; 20 SUSPENSION ORAL at 05:06

## 2017-06-29 RX ADMIN — METRONIDAZOLE 500 MG: 500 INJECTION, SOLUTION INTRAVENOUS at 09:06

## 2017-06-29 RX ADMIN — KETOROLAC TROMETHAMINE 15 MG: 30 INJECTION, SOLUTION INTRAMUSCULAR at 05:06

## 2017-06-29 RX ADMIN — ALUMINUM HYDROXIDE, MAGNESIUM HYDROXIDE, AND SIMETHICONE 30 ML: 200; 200; 20 SUSPENSION ORAL at 06:06

## 2017-06-29 NOTE — PROGRESS NOTES
Patient is discharged home per MD.  Discharge instructions on medications, signs and symptoms when to call the MD, and follow-up visits are given to the patient.  Patient verbalized complete understanding on the above discharge instructions.  Prescriptions on Tramadol, Flagyl, and Ciproflaxin given to patient.  Awaiting for family member for .  Will call for wheelchair per Transport.  Voiced no other concerns.  Safety maintained.

## 2017-06-29 NOTE — PLAN OF CARE
for d/c to home today     Future Appointments  Date Time Provider Department Center   7/28/2017 12:30 PM VERONIKA Hodges MD [facility]   8/9/2017 8:30 AM Nathaniel Gabriel MD [facility]     no hh nor dme ordered           06/29/17 1239   Final Note   Assessment Type Final Discharge Note   Discharge Disposition Home   Discharge planning education complete? Yes   What phone number can be called within the next 1-3 days to see how you are doing after discharge? [phone]   Hospital Follow Up  Appt(s) scheduled? No   Discharge plans and expectations educations in teach back method with documentation complete? No   Offered [facility] Bedside Delivery? Yes   Discharge/Hospital Encounter Summary to ([facility]) PCP n/a   Referral to Outpatient Case Management complete? n/a   Referral to / orders for Home Health Complete? n/a   30 day supply of medicines given at discharge, if documented non-compliance / non-adherence? No   Any social issues identified prior to discharge? No

## 2017-06-29 NOTE — PLAN OF CARE
Problem: Patient Care Overview  Goal: Plan of Care Review  Outcome: Ongoing (interventions implemented as appropriate)  Pt is AAOx3. No complaints of vomiting or diarrhea. Pt complains of abdominal pain and toradol and tramadol given. Clear liquid and able to tolerate. Up ad shorty. Safety precautions maintained. Tolerated all medications well.

## 2017-06-29 NOTE — PROGRESS NOTES
Progress Note    Admit Date: 6/26/2017   LOS: 2 days     SUBJECTIVE:     Pt states pain is improved, questions about when she can go back to work.    Scheduled Meds:   aluminum-magnesium hydroxide-simethicone  30 mL Oral QID (AC & HS)    ciprofloxacin  400 mg Intravenous Q12H    metronidazole  500 mg Intravenous Q8H    tramadol  50 mg Oral Q6H     Continuous Infusions:   dextrose 5 % and 0.9 % NaCl 125 mL/hr at 06/29/17 0534     PRN Meds:acetaminophen, ondansetron    Review of patient's allergies indicates:  No Known Allergies    Review of Systems  Denies headache, fever, chills, cp, sob, n/v, diarrhea, dysuria, and lower ext swelling      OBJECTIVE:     Vital Signs (Most Recent)  Temp: 97.7 °F (36.5 °C) (06/29/17 0730)  Pulse: 81 (06/29/17 0730)  Resp: 18 (06/29/17 0730)  BP: 133/80 (06/29/17 0730)  SpO2: 97 % (06/29/17 0459)    Vital Signs Range (Last 24H):  Temp:  [97.7 °F (36.5 °C)-98.7 °F (37.1 °C)]   Pulse:  [73-96]   Resp:  [16-18]   BP: (119-140)/(62-81)   SpO2:  [97 %-100 %]     I & O (Last 24H):    Intake/Output Summary (Last 24 hours) at 06/29/17 0815  Last data filed at 06/29/17 0614   Gross per 24 hour   Intake          3994.17 ml   Output             2750 ml   Net          1244.17 ml     Physical Exam:  Gen: NAD  Head: NCAT  Heart: RRR, no m/g/r  Lungs: CTAB  Abd: snt nd  Ext: 2 + peripheral pulses      Laboratory:  CBC:     Recent Labs  Lab 06/28/17  0813   WBC 9.87   RBC 4.74   HGB 12.7   HCT 38.0      MCV 80*   MCH 26.8*   MCHC 33.4     CMP:     Recent Labs  Lab 06/29/17  0658   GLU 96   CALCIUM 8.7   ALBUMIN 3.0*   PROT 6.7      K 3.5   CO2 25      BUN 5*   CREATININE 0.7   ALKPHOS 70   ALT 9*   AST 12   BILITOT 0.6           ASSESSMENT/PLAN:     Diverticulitis  - s/p 24 hour IV abx, cipro/flagyl  - outpatient f/u with GI secured  - advance regular diet, if tolerating possible discharge afternoon    Henri Meléndez MD  PGY-2   8:17 AM

## 2017-06-29 NOTE — PROGRESS NOTES
Pharmacy New Medication Education     Patient accepted medication education.     Pharmacy educated patient on the following medications, using the teach-back method.   Tramadol    Learners of pharmacy medication education included:  patient     Patient +/- learner response:  verbalize understanding

## 2017-07-03 NOTE — DISCHARGE SUMMARY
[facility]  Discharge Summary      Admit Date: 6/26/2017    Discharge Date and Time: 6/29/2017  5:44 PM    Attending Physician: No att. providers found     Reason for Admission: Diverticulitis    Procedures Performed: * No surgery found *    Hospital Course pt was admitted from ER due to diverticulitis which was discovered on a CT and with microperforation. She was admitted for IV antibiotics and patient began improve on day 2 but still had considerable abdominal pain with evidence of inflammation clinically. The patient improved on day 3 with IV abx and was tolerating a regular diet. On day 3 patient was discharged home to complete her regimen of antibiotic therapy and f/u with surgery as an outpatient.    Consults: none    Significant Diagnostic Studies    Final Diagnoses:    Principal Problem: Diverticulitis of large intestine with perforation without bleeding   Secondary Diagnoses:     Discharged Condition: stable    Disposition: Home or Self Care    Follow Up/Patient Instructions:     Medications:  Reconciled Home Medications:   Discharge Medication List as of 6/29/2017  5:19 PM      START taking these medications    Details   ciprofloxacin HCl (CIPRO) 500 MG tablet Take 1 tablet (500 mg total) by mouth every 12 (twelve) hours., Starting Thu 6/29/2017, Until Fri 7/7/2017, Print      metronidazole (FLAGYL) 500 MG tablet Take 1 tablet (500 mg total) by mouth every 12 (twelve) hours., Starting Thu 6/29/2017, Until Fri 7/7/2017, Print      tramadol (ULTRAM) 50 mg tablet Take 1 tablet (50 mg total) by mouth every 6 (six) hours as needed for Pain., Starting Thu 6/29/2017, Until Tue 7/4/2017, Print         CONTINUE these medications which have NOT CHANGED    Details   lisinopril-hydrochlorothiazide (PRINZIDE,ZESTORETIC) 10-12.5 mg per tablet Take 1 tablet by mouth once daily., Historical Med             Discharge Procedure Orders  Ambulatory referral to General Surgery   Referral Priority: Routine  Referral Type: Consultation   Referral Reason: Specialty Services Required    Referred to Provider: VERONIKA SWEET Requested Specialty: General Surgery   Number of Visits Requested: 1      Ambulatory referral to Gastroenterology   Referral Priority: Routine Referral Type: Consultation   Referral Reason: Specialty Services Required    Referred to Provider: NATHANIEL CHINCHILLA Requested Specialty: Gastroenterology   Number of Visits Requested: 1      Diet general     Activity as tolerated     Call MD for:  temperature >100.4     Call MD for:  persistent nausea and vomiting or diarrhea     Call MD for:  severe uncontrolled pain     Call MD for:  redness, tenderness, or signs of infection (pain, swelling, redness, odor or green/yellow discharge around incision site)     Call MD for:  difficulty breathing or increased cough     Call MD for:  severe persistent headache     Call MD for:  worsening rash     Call MD for:  persistent dizziness, light-headedness, or visual disturbances     Call MD for:  increased confusion or weakness       Follow-up Information     HERLINDA Sweet MD On 7/28/2017.    Specialty:  General Surgery  Why:  12:30 pm    - call for any symptoms prior to apt     Contact information:  [address]  [address]  [place] [address]  [phone]             Nathaniel Chinchilla MD On 8/9/2017.    Specialty:  Gastroenterology  Why:  8:30 am      Contact information:  [address]  [place] [address]  [phone]

## 2017-08-09 ENCOUNTER — OFFICE VISIT (OUTPATIENT)
Dept: NEUROLOGY | Facility: HOSPITAL | Age: 47
End: 2017-08-09
Attending: INTERNAL MEDICINE
Payer: COMMERCIAL

## 2017-08-09 VITALS
HEART RATE: 94 BPM | WEIGHT: 247.63 LBS | SYSTOLIC BLOOD PRESSURE: 131 MMHG | BODY MASS INDEX: 45.57 KG/M2 | HEIGHT: 62 IN | TEMPERATURE: 98 F | DIASTOLIC BLOOD PRESSURE: 88 MMHG

## 2017-08-09 DIAGNOSIS — K62.5 RECTAL BLEEDING: Primary | ICD-10-CM

## 2017-08-09 PROCEDURE — 99213 OFFICE O/P EST LOW 20 MIN: CPT | Performed by: INTERNAL MEDICINE

## 2017-08-09 RX ORDER — TRAMADOL HYDROCHLORIDE 50 MG/1
50 TABLET ORAL EVERY 6 HOURS PRN
Qty: 20 TABLET | Refills: 0 | Status: SHIPPED | OUTPATIENT
Start: 2017-08-09

## 2017-08-09 RX ORDER — SODIUM, POTASSIUM,MAG SULFATES 17.5-3.13G
1 SOLUTION, RECONSTITUTED, ORAL ORAL ONCE
Qty: 1 BOTTLE | Refills: 0 | Status: SHIPPED | OUTPATIENT
Start: 2017-08-09 | End: 2017-08-09

## 2017-08-09 RX ORDER — DICYCLOMINE HYDROCHLORIDE 10 MG/1
10 CAPSULE ORAL EVERY 8 HOURS PRN
Qty: 90 CAPSULE | Refills: 0 | Status: SHIPPED | OUTPATIENT
Start: 2017-08-09 | End: 2017-09-08

## 2017-08-09 NOTE — PROGRESS NOTES
"[facility]    CC: rectal bleeding    HPI 46 y.o. female with past medical history of several months of intermittent mild, painful, moderate volume rectal bleeding with associated constipation alternating with occaisional diarrhea, mucous in her stools and cramping abdominal pain.  She was hospitalized in June 2017 for these symptoms and imaging showed possible diverticulitis with microperforation.  She has completed antibiotics for diverticulitis.    Records reviewed and summarized as above.    Past Medical History:   Hypertension   C/S   Tubal ligation     Social History: no REEMA abuse   Family History: negative for CRC or IBD     Review of Systems  General ROS: negative for chills, fever or weight loss  Ophthalmic ROS: negative for blurry vision, photophobia or eye pain  ENT ROS: negative for epistaxis, sore throat or rhinorrhea  Respiratory ROS: no cough, shortness of breath, or wheezing  Cardiovascular ROS: no chest pain or dyspnea on exertion  Gastrointestinal ROS: positive abdominal pain and diarrhea as above   Genito-Urinary ROS: no dysuria, trouble voiding, or hematuria  Musculoskeletal ROS: negative for gait disturbance or muscular weakness  Neurological ROS: no syncope or seizures; no ataxia  Dermatological ROS: negative for pruritis, rash and jaundice    Physical Examination  /88   Pulse 94   Temp 98.2 °F (36.8 °C) (Oral)   Ht 5' 2" (1.575 m)   Wt 112.3 kg (247 lb 9.6 oz)   LMP 07/20/2017   BMI 45.29 kg/m²   General appearance: alert, cooperative, no distress  HENT: Normocephalic, atraumatic, neck symmetrical, no nasal discharge   Eyes: conjunctivae/corneas clear, PERRL, EOM's intact  Lungs: clear to auscultation bilaterally, no dullness to percussion bilaterally  Heart: regular rate and rhythm without rub; no displacement of the PMI   Abdomen: soft, non-tender; bowel sounds normoactive; no organomegaly  Extremities: extremities symmetric; no clubbing, cyanosis, or edema  Integument: " Skin color, texture, turgor normal; no rashes; hair distrubution normal  Neurologic: Alert and oriented X 3, normal strength, normal coordination and gait  Psychiatric: no pressured speech; normal affect; no evidence of impaired cognition     Labs:  BUN/Cr 5/0.7  TB 0.6  WBC 9  H/H 12.7/38  MCV 80  Plt 325    Imaging: CT abdomen 6/26/17 diverticulosis with surrounding inflammatory changes in the sigmoid colon.    Independently reviewed.    Previous medical records reviewed     Assessment: The patient is a 45 yo female with a history of chronic constipation reports a two year history of attacks of lower abdominal pain associated with diarrhea (sometimes bloody) which occur once per month on average and last 1-2 days. Recent imaging is consistent with sigmoid colitis. Her symptoms are suspicious for Crohn's disease vs neoplasm vs sigmoid intussusception vs recurrent ischemia (related to hormones from IUD?) vs recurrent diverticulitis vs endometriosis     Plan:  Diagnostic colonoscopy to further evaluate her symptoms.  Ultram for pain   Trial of bentyl   If colonoscopy is completely normal, I will recommend a pelvic ultrasound to evaluate for endometriosis as well as a trial of cirpo/flagyl x 28 days      Nathaniel Gabriel MD   [address] [facility], [address]   [place] [address] [phone]

## 2017-08-09 NOTE — PATIENT INSTRUCTIONS
[facility]   [place] [id number]  [phone]     PATIENT NAME: April Bruno  PROCEDURE DAY/TIME Thursday, August 17, 2017        CLEAR LIQUID DIET (START THE DAY BEFORE PROCEDURE): Wednesday, August 16, 2017  Clear Liquid Diet means any liquid from the list below that is not red or purple in color:  · Gatorade, Jack-Aid, Lemonade (Yellow ONLY)- Gatorade is the preferred liquid  · Tea (no milk or dairy)  · Carbonated beverages (soft drink), regular or diet  · Apple juice, white grape juice, white cranberry juice  · Jell-O (orange, lemon, or lime flavors ONLY)  · Clear, fat-free, beef or chicken broth  · Bouillon, clear consomme  · Snowball, Popsicles (NOT red or purple)  *No Solid Food or Alcohol  ITEMS TO BE PURCHASED FOR PREP (Suprep requires a prescription):                        Suprep Bowel Prep Kit  BOWEL PREP INSTRUCTIONS THE DAY BEFORE THE EXAM:  Wednesday, August 16, 2017  1. Drink only clear liquids (see the above diet) all day. Gatorade is the best liquid. Drink an extra 8 ounces of clear liquid every hour from 11am to 5pm.  2. At 6pm, pour one 6-ounce bottle of Suprep liquid into the mixing container then add cool drinking water to the 16 ounce line on the container and mix. Drink all the liquid in the container.  3. Over the nex hour (between 6-7pm), you must drink two more 16-ounce containers of water or other clear liquid.   THE DAY OF THE EXAM: Thursday, August 17, 2017  1. Take your morning medications, if any, with a small sip of water.  2. Beginning 5 hours before your procedure time, mix and drink the 2nd bottle of                 Suprep liquid followed by two more 16-ounce containers of clear liquid as done        the night before.      *If your procedure is scheduled for the early morning, you will need to get up in the middle of the night to take this dose of preparation. The correct timing of this dose is essential to an effective preparation. If  you do not take this dose, your exam may be incomplete and need to be repeated.*     3. Have nothing to eat or drink for 3 hours before the procedure.  4. Bring someone to drive you home (you should not drive for 12 hrs after the exam)  5. Report to Admitting, 1st floor hospital entrance 1 hour before procedure time.

## 2017-08-14 ENCOUNTER — TELEPHONE (OUTPATIENT)
Dept: NEUROLOGY | Facility: HOSPITAL | Age: 47
End: 2017-08-14

## 2017-08-14 NOTE — TELEPHONE ENCOUNTER
----- Message from Jagruti Solis LPN sent at 8/9/2017 10:30 AM CDT -----  Colonoscopy scheduled on August 17, 2017.  CPT 06137, DX- K57.20.  Thanks.

## 2017-08-17 ENCOUNTER — ANESTHESIA (OUTPATIENT)
Dept: ENDOSCOPY | Facility: HOSPITAL | Age: 47
End: 2017-08-17
Payer: COMMERCIAL

## 2017-08-17 ENCOUNTER — HOSPITAL ENCOUNTER (OUTPATIENT)
Facility: HOSPITAL | Age: 47
Discharge: HOME | End: 2017-08-17
Attending: INTERNAL MEDICINE | Admitting: INTERNAL MEDICINE
Payer: COMMERCIAL

## 2017-08-17 ENCOUNTER — SURGERY (OUTPATIENT)
Age: 47
End: 2017-08-17

## 2017-08-17 ENCOUNTER — ANESTHESIA EVENT (OUTPATIENT)
Dept: ENDOSCOPY | Facility: HOSPITAL | Age: 47
End: 2017-08-17
Payer: COMMERCIAL

## 2017-08-17 VITALS
RESPIRATION RATE: 18 BRPM | WEIGHT: 245 LBS | SYSTOLIC BLOOD PRESSURE: 119 MMHG | HEART RATE: 87 BPM | DIASTOLIC BLOOD PRESSURE: 78 MMHG | TEMPERATURE: 98 F | OXYGEN SATURATION: 99 % | HEIGHT: 62 IN | BODY MASS INDEX: 45.08 KG/M2

## 2017-08-17 DIAGNOSIS — K63.5 POLYP OF COLON, UNSPECIFIED PART OF COLON, UNSPECIFIED TYPE: ICD-10-CM

## 2017-08-17 DIAGNOSIS — K62.5 RECTAL BLEEDING: Primary | ICD-10-CM

## 2017-08-17 DIAGNOSIS — K57.20 DIVERTICULITIS OF LARGE INTESTINE WITH PERFORATION WITHOUT BLEEDING: ICD-10-CM

## 2017-08-17 LAB
B-HCG UR QL: NEGATIVE
CTP QC/QA: YES

## 2017-08-17 PROCEDURE — 81025 URINE PREGNANCY TEST: CPT | Performed by: INTERNAL MEDICINE

## 2017-08-17 PROCEDURE — 88305 TISSUE EXAM BY PATHOLOGIST: CPT | Performed by: PATHOLOGY

## 2017-08-17 PROCEDURE — 25000003 PHARM REV CODE 250: Performed by: INTERNAL MEDICINE

## 2017-08-17 PROCEDURE — 88305 TISSUE EXAM BY PATHOLOGIST: CPT | Mod: 26,,, | Performed by: PATHOLOGY

## 2017-08-17 PROCEDURE — 27201012 HC FORCEPS, HOT/COLD, DISP: Performed by: INTERNAL MEDICINE

## 2017-08-17 PROCEDURE — 37000008 HC ANESTHESIA 1ST 15 MINUTES: Performed by: INTERNAL MEDICINE

## 2017-08-17 PROCEDURE — 63600175 PHARM REV CODE 636 W HCPCS: Performed by: NURSE ANESTHETIST, CERTIFIED REGISTERED

## 2017-08-17 PROCEDURE — 45380 COLONOSCOPY AND BIOPSY: CPT | Performed by: INTERNAL MEDICINE

## 2017-08-17 PROCEDURE — 37000009 HC ANESTHESIA EA ADD 15 MINS: Performed by: INTERNAL MEDICINE

## 2017-08-17 RX ORDER — LIDOCAINE HCL/PF 100 MG/5ML
SYRINGE (ML) INTRAVENOUS
Status: DISCONTINUED | OUTPATIENT
Start: 2017-08-17 | End: 2017-08-17

## 2017-08-17 RX ORDER — SODIUM CHLORIDE 9 MG/ML
INJECTION, SOLUTION INTRAVENOUS CONTINUOUS
Status: DISCONTINUED | OUTPATIENT
Start: 2017-08-17 | End: 2017-08-17 | Stop reason: HOSPADM

## 2017-08-17 RX ORDER — PROPOFOL 10 MG/ML
VIAL (ML) INTRAVENOUS CONTINUOUS PRN
Status: DISCONTINUED | OUTPATIENT
Start: 2017-08-17 | End: 2017-08-17

## 2017-08-17 RX ORDER — PROPOFOL 10 MG/ML
VIAL (ML) INTRAVENOUS
Status: DISCONTINUED | OUTPATIENT
Start: 2017-08-17 | End: 2017-08-17

## 2017-08-17 RX ADMIN — PROPOFOL 20 MG: 10 INJECTION, EMULSION INTRAVENOUS at 10:08

## 2017-08-17 RX ADMIN — PROPOFOL 60 MG: 10 INJECTION, EMULSION INTRAVENOUS at 10:08

## 2017-08-17 RX ADMIN — LIDOCAINE HYDROCHLORIDE 20 MG: 20 INJECTION, SOLUTION INTRAVENOUS at 10:08

## 2017-08-17 RX ADMIN — PROPOFOL 20 MG: 10 INJECTION, EMULSION INTRAVENOUS at 11:08

## 2017-08-17 RX ADMIN — PROPOFOL 150 MCG/KG/MIN: 10 INJECTION, EMULSION INTRAVENOUS at 10:08

## 2017-08-17 RX ADMIN — PROPOFOL 40 MG: 10 INJECTION, EMULSION INTRAVENOUS at 11:08

## 2017-08-17 RX ADMIN — SODIUM CHLORIDE: 0.9 INJECTION, SOLUTION INTRAVENOUS at 10:08

## 2017-08-17 NOTE — H&P
"[facility]     CC: rectal bleeding     HPI 46 y.o. female with past medical history of several months of intermittent mild, painful, moderate volume rectal bleeding with associated constipation alternating with occaisional diarrhea, mucous in her stools and cramping abdominal pain.  She was hospitalized in June 2017 for these symptoms and imaging showed possible diverticulitis with microperforation.  She has completed antibiotics for diverticulitis.     Records reviewed and summarized as above.     Past Medical History:   Hypertension   C/S   Tubal ligation      Social History: no REEMA abuse   Family History: negative for CRC or IBD      Review of Systems  General ROS: negative for chills, fever or weight loss  Ophthalmic ROS: negative for blurry vision, photophobia or eye pain  ENT ROS: negative for epistaxis, sore throat or rhinorrhea  Respiratory ROS: no cough, shortness of breath, or wheezing  Cardiovascular ROS: no chest pain or dyspnea on exertion  Gastrointestinal ROS: positive abdominal pain and diarrhea as above   Genito-Urinary ROS: no dysuria, trouble voiding, or hematuria  Musculoskeletal ROS: negative for gait disturbance or muscular weakness  Neurological ROS: no syncope or seizures; no ataxia  Dermatological ROS: negative for pruritis, rash and jaundice     Physical Examination  /88   Pulse 94   Temp 98.2 °F (36.8 °C) (Oral)   Ht 5' 2" (1.575 m)   Wt 112.3 kg (247 lb 9.6 oz)   LMP 07/20/2017   BMI 45.29 kg/m²   General appearance: alert, cooperative, no distress  HENT: Normocephalic, atraumatic, neck symmetrical, no nasal discharge   Eyes: conjunctivae/corneas clear, PERRL, EOM's intact  Lungs: clear to auscultation bilaterally, no dullness to percussion bilaterally  Heart: regular rate and rhythm without rub; no displacement of the PMI   Abdomen: soft, non-tender; bowel sounds normoactive; no organomegaly  Extremities: extremities symmetric; no clubbing, cyanosis, or " edema  Integument: Skin color, texture, turgor normal; no rashes; hair distrubution normal  Neurologic: Alert and oriented X 3, normal strength, normal coordination and gait  Psychiatric: no pressured speech; normal affect; no evidence of impaired cognition      Labs:  BUN/Cr 5/0.7  TB 0.6  WBC 9  H/H 12.7/38  MCV 80  Plt 325     Imaging: CT abdomen 6/26/17 diverticulosis with surrounding inflammatory changes in the sigmoid colon.     Independently reviewed.     Previous medical records reviewed      Assessment: The patient is a 47 yo female with a history of chronic constipation reports a two year history of attacks of lower abdominal pain associated with diarrhea (sometimes bloody) which occur once per month on average and last 1-2 days. Recent imaging is consistent with sigmoid colitis. Her symptoms are suspicious for Crohn's disease vs neoplasm vs sigmoid intussusception vs recurrent ischemia (related to hormones from IUD?) vs recurrent diverticulitis vs endometriosis      Plan:  Diagnostic colonoscopy to further evaluate her symptoms.  Ultram for pain   Trial of bentyl   If colonoscopy is completely normal, I will recommend a pelvic ultrasound to evaluate for endometriosis as well as a trial of cirpo/flagyl x 28 days        Nathaniel Gabriel MD   [address], [address]   [place] [address] [phone]

## 2017-08-17 NOTE — DISCHARGE INSTRUCTIONS
Discharge Summary/Instructions for after Colonoscopy with Biopsy/Polypectomy    April NEIL Carr  8/17/2017  Nathaniel Gabriel MD    Restrictions on Activity:    - Do not drive car or operate machinery until the day after the procedure.  - The following day: return to full activity including work.  - For 3 days: No heavy lifting, straining or running.  - Diet: Eat and drink normally unless instructed otherwise.    Treatment for Common Side Effects:  - Mild abdominal pain and bloating or excessive gas: rest, eat lightly and use a heating pad.     Symptoms to watch for and report to your physician:  1. Severe abdominal pain.  2. Fever within 24 hours after a procedure.  3. A large amount of rectal bleeding. (A small amount of blood from the rectum is not serious, especially if hemorrhoids are present.)  4. Because air was put into your colon during the procedure, expelling large amount of air from your rectum is normal.  5. You may not have a bowel movement for 1-3 days because of the colonoscopy prep. This is normal.  6. Go directly to the emergency room if you notice any of the following:     Chills and/or fever over 101   Persistent vomiting   Severe abdominal pain, other than gas cramps   Severe chest pain   Black, tarry stools   Any bleeding - exceeding one tablespoon    If you have any questions or problems, please call your Physician:    Nathaniel Gabriel MD      Lab Results: Contact Physician's Office      If a complication or emergency situation arises and you are unable to reach your Physician - GO TO THE EMERGENCY ROOM.

## 2017-08-17 NOTE — ANESTHESIA POSTPROCEDURE EVALUATION
"Anesthesia Post Evaluation    Patient: April M Bruno    Procedure(s) Performed: Procedure(s) (LRB):  COLONOSCOPY (N/A)    Final Anesthesia Type: MAC  Patient location during evaluation: GI PACU  Patient participation: Yes- Able to Participate  Level of consciousness: awake and alert and oriented  Post-procedure vital signs: reviewed and stable  Pain management: adequate  Airway patency: patent  PONV status at discharge: No PONV  Anesthetic complications: no      Cardiovascular status: blood pressure returned to baseline and hemodynamically stable  Respiratory status: unassisted  Hydration status: euvolemic  Follow-up not needed.        Visit Vitals  /82 (BP Location: Left arm, Patient Position: Lying)   Pulse 85   Temp 37.1 °C (98.8 °F) (Oral)   Resp 18   Ht 5' 2" (1.575 m)   Wt 111.1 kg (245 lb)   LMP 08/17/2017 (Exact Date)   SpO2 100%   Breastfeeding? No   BMI 44.81 kg/m²       Pain/Natanael Score: Pain Assessment Performed: Yes (8/17/2017  9:55 AM)  Presence of Pain: denies (8/17/2017  9:55 AM)      "

## 2017-08-17 NOTE — ANESTHESIA PREPROCEDURE EVALUATION
08/17/2017 April NEIL Carr is a 46 y.o., female for Cscope    Anesthesia Evaluation    I have reviewed the Patient Summary Reports.    I have reviewed the Nursing Notes.      Review of Systems  Anesthesia Hx:  History of prior surgery of interest to airway management or planning: Previous anesthesia: General, MAC  Denies Personal Hx of Anesthesia complications.   Social:  Non-Smoker, No Alcohol Use    Hematology/Oncology:  Hematology Normal        Cardiovascular:   Exercise tolerance: good Hypertension    Pulmonary:  Pulmonary Normal    Renal/:  Renal/ Normal     Hepatic/GI:  Hepatic/GI Normal    Neurological:  Neurology Normal    Endocrine:  Endocrine Normal        Physical Exam  General:  Well nourished    Airway/Jaw/Neck:  Airway Findings: Mouth Opening: Normal Tongue: Normal  General Airway Assessment: Adult  Mallampati: II  Improves to I with phonation.  TM Distance: Normal, at least 6 cm      Dental:  Dental Findings: upper front caps              Anesthesia Plan  Type of Anesthesia, risks & benefits discussed:  Anesthesia Type:  MAC, general  Patient's Preference:   Intra-op Monitoring Plan: standard ASA monitors  Intra-op Monitoring Plan Comments:   Post Op Pain Control Plan:   Post Op Pain Control Plan Comments:   Induction:   IV  Beta Blocker:  Patient is not currently on a Beta-Blocker (No further documentation required).       Informed Consent: Patient understands risks and agrees with Anesthesia plan.  Questions answered. Anesthesia consent signed with patient.  ASA Score: 2     Day of Surgery Review of History & Physical:            Ready For Surgery From Anesthesia Perspective.

## 2017-08-17 NOTE — TRANSFER OF CARE
"Anesthesia Transfer of Care Note    Patient: April M Bruno    Procedure(s) Performed: Procedure(s) (LRB):  COLONOSCOPY (N/A)    Patient location: GI    Anesthesia Type: MAC    Transport from OR: Transported from OR on room air with adequate spontaneous ventilation    Post pain: adequate analgesia    Post assessment: no apparent anesthetic complications and tolerated procedure well    Post vital signs: stable    Level of consciousness: awake, alert and oriented    Nausea/Vomiting: no nausea/vomiting    Complications: none          Last vitals:   Visit Vitals  /82 (BP Location: Left arm, Patient Position: Lying)   Pulse 85   Temp 37.1 °C (98.8 °F) (Oral)   Resp 18   Ht 5' 2" (1.575 m)   Wt 111.1 kg (245 lb)   LMP 08/17/2017 (Exact Date)   SpO2 100%   Breastfeeding? No   BMI 44.81 kg/m²     "

## 2017-08-21 ENCOUNTER — TELEPHONE (OUTPATIENT)
Dept: NEUROLOGY | Facility: HOSPITAL | Age: 47
End: 2017-08-21

## 2017-08-21 PROBLEM — K63.5 POLYP OF COLON: Status: ACTIVE | Noted: 2017-08-21

## 2024-04-11 NOTE — TELEPHONE ENCOUNTER
[facility]    I have reviewed the patient's pathology results and left her a voicemail.  The polyp removed was a hyperplastic polyp.  We will plan for her next colonoscopy at the age of 52.  If she has any questions she should call my office.  
The patient is a 43y Female complaining of palpitations.